# Patient Record
Sex: FEMALE | Race: WHITE | NOT HISPANIC OR LATINO | Employment: OTHER | URBAN - NONMETROPOLITAN AREA
[De-identification: names, ages, dates, MRNs, and addresses within clinical notes are randomized per-mention and may not be internally consistent; named-entity substitution may affect disease eponyms.]

---

## 2019-09-08 NOTE — PROGRESS NOTES
Jermaine Miranda MD     Chief Complaint   Patient presents with   • Hoarse        History of Present Illness   Anyi Colón is a  84 y.o. female who complains of hoarseness, a globus sensation and throat clearing. The symptoms are localized to the throat. The patient has had moderate symptoms. The symptoms have been relatively constant for the last 3 months. The symptoms are aggravated by  diuretic use. The patient  reports that she has been smoking cigarettes.  She started smoking about 6 months ago. She has never used smokeless tobacco.. She drinks 8-9 non caffeinated drinks a day and minimal caffeinated drinks a day. She has not had a history of acid reflux symptoms but has had indigestion. She is being treated for acid reflux. The patient has been taking: prilosec.There have been no factors that have improved the symptoms.      Review of Systems  Reviewed per patient intake note    Past History:  Past Medical History:   Diagnosis Date   • Bulging lumbar disc    • Chronic kidney failure    • GERD (gastroesophageal reflux disease)    • Hypertension    • Irritable bowel syndrome with diarrhea    • Restless legs syndrome (RLS)    • Uterine cancer (CMS/HCC)      Past Surgical History:   Procedure Laterality Date   • CATARACT EXTRACTION     • CHOLECYSTECTOMY     • HYSTERECTOMY       Family History   Problem Relation Age of Onset   • Heart failure Father      Social History     Tobacco Use   • Smoking status: Current Every Day Smoker     Types: Cigarettes     Start date: 3/9/2019   • Smokeless tobacco: Never Used   • Tobacco comment: Smokes one cigarette per day   Substance Use Topics   • Alcohol use: Yes     Alcohol/week: 0.6 oz     Types: 1 Shots of liquor per week     Frequency: Never     Comment: drinks one shot of sprite and whiskey at bedtime   • Drug use: Not on file     Outpatient Medications Marked as Taking for the 9/9/19 encounter (Office Visit) with Jermaine Miranda MD   Medication Sig Dispense  Refill   • amLODIPine (NORVASC) 10 MG tablet TK 1 T PO QD FOR BP  5   • calcitriol (ROCALTROL) 0.25 MCG capsule TK 1 C PO Q DAY. NOTE DOSE INCREASE  5   • carvedilol (COREG) 25 MG tablet Take 25 mg by mouth 2 (Two) Times a Day.  3   • dicyclomine (BENTYL) 10 MG capsule TK ONE C PO  QID BEFORE MEALS AND HS  2   • diphenoxylate-atropine (LOMOTIL) 2.5-0.025 MG per tablet Take 1 tablet by mouth 4 (Four) Times a Day As Needed for Diarrhea.     • ergocalciferol (ERGOCALCIFEROL) 96265 units capsule ergocalciferol (vitamin D2) 50,000 unit capsule     • furosemide (LASIX) 40 MG tablet   2   • omeprazole (priLOSEC) 40 MG capsule omeprazole 40 mg capsule,delayed release     • potassium chloride (MICRO-K) 10 MEQ CR capsule   5   • predniSONE (DELTASONE) 10 MG tablet Take  by mouth Daily.  5   • rOPINIRole (REQUIP) 5 MG tablet TK 1 T PO QPM  4   • venlafaxine XR (EFFEXOR-XR) 150 MG 24 hr capsule Take 150 mg by mouth Daily.       Allergies:  Biaxin [clarithromycin]; Librax [chlordiazepoxide-clidinium]; Penicillins; and Tequin [gatifloxacin]        Vital Signs:   Heart Rate:  [61] 61  Resp:  [18] 18  BP: (127)/(69) 127/69    Physical Exam:   CONSTITUTIONAL: well nourished, well-developed, alert, oriented, in no acute distress   COMMUNICATION AND VOICE: able to communicate normally, normal voice quality  HEAD: normocephalic, no lesions, atraumatic, no tenderness, no masses   FACE: appearance normal, no lesions, no tenderness, no deformities, facial motion symmetric  SALIVARY GLANDS: parotid glands with no tenderness, no swelling, no masses, submandibular glands with normal size, nontender  EYES: ocular motility normal, eyelids normal, orbits normal, no proptosis, conjunctiva normal , pupils equal, round  HEARING: response to conversational voice normal bilaterally   EXTERNAL EARS: auricles without lesions  EXTERNAL EAR CANALS: normal ear canals without stenosis or significant cerumen  TYMPANIC MEMBRANES: tympanic membrane  appearance normal, no lesions, no perforation, normal mobility, no fluid  EXTERNAL NOSE: structure normal, no tenderness on palpation, no nasal discharge, no lesions, no evidence of trauma, nostrils patent  INTRANASAL EXAM: nasal mucosa with mucosal congestion and erythema, nasal septum without overt anterior deviation  NASOPHARYNX: nasopharyngeal mucosa, adenoids within normal limits  LIPS: structure normal, no tenderness on palpation, no lesions, no evidence of trauma  TEETH: dentition within normal limits for age  GUMS: gingivae healthy  ORAL MUCOSA: oral mucosa with diffuse dryness, no mucosal lesions   FLOOR OF MOUTH: Warthin's duct patent, mucosa normal  TONGUE: lingual mucosa normal without lesions, normal tongue mobility  PALATE: soft and hard palates with normal mucosa and structure  OROPHARYNX: oropharyngeal mucosa normal, tonsil fossa normal in appearance  HYPOPHARYNX: hypopharyngeal mucosa normal  LARYNX: diffuse inflammation and thick mucous present, epiglottis and arytenoid cartilage within normal limits, vocal cord with normal mobility   NECK: neck appearance normal, no masses or tenderness  THYROID: no overt thyromegaly, no tenderness, nodules or mass present on palpation, position midline   LYMPH NODES: no lymphadenopathy  CHEST/RESPIRATORY: respiratory effort normal, normal breath sounds  CARDIOVASCULAR: rate and rhythm normal, extremities without cyanosis or edema, no overt jugulovenous distension present  NEUROLOGIC/PSYCHIATRIC: oriented appropriately for age, mood normal, affect appropriate, cranial nerves intact grossly unless specifically mentioned above          Assessment   1. Chronic laryngitis    2. Sicca laryngitis    3. Laryngopharyngeal reflux        Plan    Call for problems, especially  stridor, aspiration and worsening symptoms.  Increase water/ non caffeinated drink intake to at least 6-8 glasses a day. Decrease caffeine intake. Plain Mucinex over the counter as needed to thin out  secretions.  Sleep with the head of bed on an incline. No large meals 1-2 hrs prior to sleep. Avoid fatty meals and caffine or alcohol prior to sleep.   Take proton pump inhibitor (Omeprazole, Prilosec, Prevacid, Protonix etc) 30-60 minutes prior o the last meal of the day.  Limit voice use for 4 weeks. If talking is necessary, use a low volume voice rather than a whisper. Avoid coughing and throat clearing as much as possible.  The patient was advised to discuss medication with her primary care provider to see if she can limit the drying effects of the diuretics on the voice box.  New Medications Ordered This Visit   Medications   • guaiFENesin (MUCINEX) 600 MG 12 hr tablet     Sig: Take 2 tablets by mouth Every 12 (Twelve) Hours for 30 days.     Dispense:  60 tablet     Refill:  3          Return in about 3 months (around 12/9/2019).    Jermaine Miranda MD  09/09/19  11:50 AM

## 2019-09-09 ENCOUNTER — OFFICE VISIT (OUTPATIENT)
Dept: OTOLARYNGOLOGY | Facility: CLINIC | Age: 84
End: 2019-09-09

## 2019-09-09 VITALS
BODY MASS INDEX: 28.34 KG/M2 | RESPIRATION RATE: 18 BRPM | DIASTOLIC BLOOD PRESSURE: 69 MMHG | HEIGHT: 64 IN | SYSTOLIC BLOOD PRESSURE: 127 MMHG | HEART RATE: 61 BPM | WEIGHT: 166 LBS

## 2019-09-09 DIAGNOSIS — J37.0 SICCA LARYNGITIS: ICD-10-CM

## 2019-09-09 DIAGNOSIS — K21.9 LARYNGOPHARYNGEAL REFLUX: ICD-10-CM

## 2019-09-09 DIAGNOSIS — J37.0 CHRONIC LARYNGITIS: Primary | ICD-10-CM

## 2019-09-09 PROCEDURE — 99203 OFFICE O/P NEW LOW 30 MIN: CPT | Performed by: OTOLARYNGOLOGY

## 2019-09-09 RX ORDER — CARVEDILOL 25 MG/1
25 TABLET ORAL 2 TIMES DAILY
Refills: 3 | COMMUNITY
Start: 2019-09-04

## 2019-09-09 RX ORDER — FUROSEMIDE 40 MG/1
TABLET ORAL
Refills: 2 | COMMUNITY
Start: 2019-07-16

## 2019-09-09 RX ORDER — AMLODIPINE BESYLATE 10 MG/1
TABLET ORAL
Refills: 5 | COMMUNITY
Start: 2019-08-19

## 2019-09-09 RX ORDER — GUAIFENESIN 600 MG/1
1200 TABLET, EXTENDED RELEASE ORAL EVERY 12 HOURS SCHEDULED
Qty: 60 TABLET | Refills: 3 | Status: SHIPPED | OUTPATIENT
Start: 2019-09-09 | End: 2019-10-09

## 2019-09-09 RX ORDER — OMEPRAZOLE 40 MG/1
CAPSULE, DELAYED RELEASE ORAL
COMMUNITY
Start: 2019-05-30

## 2019-09-09 RX ORDER — PREDNISONE 10 MG/1
TABLET ORAL DAILY
Refills: 5 | COMMUNITY
Start: 2019-08-16

## 2019-09-09 RX ORDER — POTASSIUM CHLORIDE 750 MG/1
CAPSULE, EXTENDED RELEASE ORAL
Refills: 5 | COMMUNITY
Start: 2019-07-31

## 2019-09-09 RX ORDER — ERGOCALCIFEROL 1.25 MG/1
CAPSULE ORAL
COMMUNITY
Start: 2018-04-23

## 2019-09-09 RX ORDER — CALCITRIOL 0.25 UG/1
CAPSULE, LIQUID FILLED ORAL
Refills: 5 | COMMUNITY
Start: 2019-08-11

## 2019-09-09 RX ORDER — VENLAFAXINE HYDROCHLORIDE 150 MG/1
150 CAPSULE, EXTENDED RELEASE ORAL DAILY
COMMUNITY

## 2019-09-09 RX ORDER — ROPINIROLE 5 MG/1
TABLET, FILM COATED ORAL
Refills: 4 | COMMUNITY
Start: 2019-08-12

## 2019-09-09 RX ORDER — DIPHENOXYLATE HYDROCHLORIDE AND ATROPINE SULFATE 2.5; .025 MG/1; MG/1
1 TABLET ORAL 4 TIMES DAILY PRN
COMMUNITY

## 2019-09-09 RX ORDER — DICYCLOMINE HYDROCHLORIDE 10 MG/1
CAPSULE ORAL
Refills: 2 | COMMUNITY
Start: 2019-07-29

## 2019-09-09 NOTE — PROGRESS NOTES
Patricia Reece RN   Patient Intake Note    Review of Systems  Review of Systems   Constitutional: Negative for chills and fever.   HENT:        See HPI   Respiratory: Positive for cough (Constant throat clearing), choking and shortness of breath.    Gastrointestinal: Positive for diarrhea. Negative for nausea and vomiting.   Endocrine: Positive for cold intolerance.   Musculoskeletal: Negative for neck pain and neck stiffness.   Skin: Negative for color change.   Neurological: Positive for dizziness. Negative for light-headedness and headaches.   Hematological: Bruises/bleeds easily.   Psychiatric/Behavioral: Negative for sleep disturbance.       QUALITY MEASURES    Tobacco Use: Screening and Cessation Intervention  Social History    Tobacco Use      Smoking status: Current Every Day Smoker        Types: Cigarettes        Start date: 3/9/2019      Smokeless tobacco: Never Used      Tobacco comment: Smokes one cigarette per day    Smoking cessation information given in after visit summary.    Patricia Recee RN  9/9/2019  11:24 AM

## 2019-09-09 NOTE — PATIENT INSTRUCTIONS
Call for problems, especially  stridor, aspiration and worsening symptoms.  Increase water/ non caffeinated drink intake to at least 6-8 glasses a day. Decrease caffeine intake. Plain Mucinex over the counter as needed to thin out secretions.  Sleep with the head of bed on an incline. No large meals 1-2 hrs prior to sleep. Avoid fatty meals and caffine or alcohol prior to sleep.   Take proton pump inhibitor (Omeprazole, Prilosec, Prevacid, Protonix etc) 30-60 minutes prior o the last meal of the day.  Limit voice use for 4 weeks. If talking is necessary, use a low volume voice rather than a whisper. Avoid coughing and throat clearing as much as possible.  Discuss medication with her primary care provider to see if you can limit the drying effects of the diuretics on the voice box.    ALL ABOUT HEARTBURN AND THE LIFESTYLE CHANGES THAT HELP    Lifestyle Changes Can Help Relieve The Burning Pain In Your Tummy    What is Heartburn?  Heartburn occurs when the lining of the esophagus (the tube that connects the mouth to the stomach) is exposed to and irritated by stomach acid.  Heartburn often feels like a burning pain in the middle of your chest that moves up your throat.  You may also have the sensation that food has come back into your mouth, leaving a sour or bitter taste.  Almost everyone has heartburn once in a while.  But if you have heartburn 2 or more times per week, it can be a sign of a more serious problem call gastroesophogeal reflux disease, or GERD.    What causes Heartburn?  Heartburn usually occurs when the valve at the [place]izabeth of the stomach (the lower esophageal sphincter or LES) doesn’t close the way it should.  If the LES is weak or opens at the wrong time, stomach acid can reflux (flow back) into the esophagus and cause heartburn.  Factors that can affect the LES include:    • Eating or drinking certain foods and beverages such as chocolate, peppermint, fried or fatty foods, coffee, or drinks that  contain alcohol  • Having a hiatal hernia - a common physical condition where part of the stomach protrudes up through the diaphragm  • Lying down  • Smoking cigarettes  • Taking certain medications (be sure to tell your doctor about all medications or supplements you take)    What foods can make heartburn worse?  All foods cause the stomach to produce acid, although foods can affect people in different ways.  Following is a list of foods and beverages that may aggravate your symptoms.  You may want to eat them less often.    • Fried or fatty foods  • Heavy seasoning and spicy foods  • Coffee  • Onions  • Orange juice, grapefruit juice, and tomato juice  • Alcoholic beverages  • Chocolate  • Peppermint and spearmint    What else can I do to help control my heartburn?  Try these lifestyle changes:  • Stop Smoking  • Lose weight - if you’re overweight  • Exercise to help control your weight (talk to your doctor first before starting any exercise program).  • Eat small, well-balanced meals  • Reduce abdominal pressure-don’t wear tight belts or tight clothing  • Avoid eating within 2 hours before bedtime  • Elevate your bed so the head is 6 to 8 inches higher than the foot.  This can help reduce acid reflux at night  • Let your doctor know about all the drugs and supplements you are taking.  Some of them may contribute to your heartburn.    What if these things don’t work?  Talk to your doctor, who can discuss many treatments with you.  Although there are several possible causes of heartburn associated with GERD, they all involve stomach acids.  So no matter what the cause may be, the medicines to reduce stomach acid can prevent heartburn.